# Patient Record
Sex: FEMALE | Race: WHITE
[De-identification: names, ages, dates, MRNs, and addresses within clinical notes are randomized per-mention and may not be internally consistent; named-entity substitution may affect disease eponyms.]

---

## 2018-01-24 ENCOUNTER — HOSPITAL ENCOUNTER (OUTPATIENT)
Dept: HOSPITAL 63 - CT | Age: 78
Discharge: HOME | End: 2018-01-24
Attending: FAMILY MEDICINE
Payer: MEDICARE

## 2018-01-24 DIAGNOSIS — K76.0: ICD-10-CM

## 2018-01-24 DIAGNOSIS — R10.84: Primary | ICD-10-CM

## 2018-01-24 DIAGNOSIS — K57.30: ICD-10-CM

## 2018-01-24 PROCEDURE — 74176 CT ABD & PELVIS W/O CONTRAST: CPT

## 2018-01-24 NOTE — RAD
CT ABD PEL W/ORAL CONTRST ONLY 

 

Indication: Abdominal pain, left side distention

 

Technique: Noncontrast CT imaging was performed of the abdomen and pelvis,

multiplanar reconstruction images submitted. Oral contrast was given. One 

or more of the following individualized dose reduction techniques were 

utilized for this examination:  1. Automated exposure control  2. 

Adjustment of the mA and/or kV according to patient size  3. Use of 

iterative reconstruction technique.

 

Comparison: None available

 

Findings:  

There is no abnormality limited visualized lung bases. There is some 

motion degradation. There is diffuse hepatic steatosis. Accurate 

evaluation of abdominal visceral organs is limited without intravenous 

contrast, no obvious focal abnormality of the pancreas. There has been 

cholecystectomy. There is no hydronephrosis or renal calculus. There is 

scattered atherosclerotic calcification abdominal aorta. The bowel is not 

considered significantly dilated. Segment of small bowel in the left and 

central abdomen is not fully opacified with contrast for accurate 

evaluation. There is mild-to-moderate sigmoid diverticulosis and minimally

of the descending colon without associated inflammatory change. There is 

retained stool greatest of the ascending and transverse colon. Appendix is

not clearly identified if still present. Urinary bladder is somewhat 

distended. There are Schmorl's nodes at L1-2. There is mild lumbar 

dextroscoliosis.

 

IMPRESSION:

1.  No significant inflammatory change is identified. Appendix is not 

confidently identified if still present. There is retained stool in the 

colon. There is colonic diverticulosis.

2. There is hepatic steatosis.

 

Electronically signed by: Gerard Shabazz MD (1/24/2018 3:25 PM) 

[id number]

## 2020-12-24 ENCOUNTER — HOSPITAL ENCOUNTER (INPATIENT)
Dept: HOSPITAL 63 - ER | Age: 80
LOS: 7 days | Discharge: HOME HEALTH | DRG: 871 | End: 2020-12-31
Attending: FAMILY MEDICINE | Admitting: FAMILY MEDICINE
Payer: MEDICARE

## 2020-12-24 VITALS — SYSTOLIC BLOOD PRESSURE: 123 MMHG | DIASTOLIC BLOOD PRESSURE: 65 MMHG

## 2020-12-24 VITALS — WEIGHT: 151.9 LBS | HEIGHT: 62 IN | BODY MASS INDEX: 27.95 KG/M2

## 2020-12-24 DIAGNOSIS — F17.210: ICD-10-CM

## 2020-12-24 DIAGNOSIS — N18.32: ICD-10-CM

## 2020-12-24 DIAGNOSIS — I13.0: ICD-10-CM

## 2020-12-24 DIAGNOSIS — E78.00: ICD-10-CM

## 2020-12-24 DIAGNOSIS — F41.9: ICD-10-CM

## 2020-12-24 DIAGNOSIS — I50.32: ICD-10-CM

## 2020-12-24 DIAGNOSIS — U07.1: ICD-10-CM

## 2020-12-24 DIAGNOSIS — D64.9: ICD-10-CM

## 2020-12-24 DIAGNOSIS — Z85.42: ICD-10-CM

## 2020-12-24 DIAGNOSIS — N17.9: ICD-10-CM

## 2020-12-24 DIAGNOSIS — Z80.1: ICD-10-CM

## 2020-12-24 DIAGNOSIS — E78.5: ICD-10-CM

## 2020-12-24 DIAGNOSIS — J12.89: ICD-10-CM

## 2020-12-24 DIAGNOSIS — E03.9: ICD-10-CM

## 2020-12-24 DIAGNOSIS — M19.90: ICD-10-CM

## 2020-12-24 DIAGNOSIS — Z99.81: ICD-10-CM

## 2020-12-24 DIAGNOSIS — Z95.1: ICD-10-CM

## 2020-12-24 DIAGNOSIS — G47.33: ICD-10-CM

## 2020-12-24 DIAGNOSIS — A41.89: Primary | ICD-10-CM

## 2020-12-24 DIAGNOSIS — Z82.5: ICD-10-CM

## 2020-12-24 DIAGNOSIS — Z86.718: ICD-10-CM

## 2020-12-24 DIAGNOSIS — J43.9: ICD-10-CM

## 2020-12-24 DIAGNOSIS — J96.21: ICD-10-CM

## 2020-12-24 DIAGNOSIS — E11.22: ICD-10-CM

## 2020-12-24 DIAGNOSIS — E11.42: ICD-10-CM

## 2020-12-24 DIAGNOSIS — Z81.8: ICD-10-CM

## 2020-12-24 DIAGNOSIS — E05.90: ICD-10-CM

## 2020-12-24 DIAGNOSIS — Z90.49: ICD-10-CM

## 2020-12-24 DIAGNOSIS — K21.9: ICD-10-CM

## 2020-12-24 DIAGNOSIS — Z82.0: ICD-10-CM

## 2020-12-24 DIAGNOSIS — Z90.710: ICD-10-CM

## 2020-12-24 DIAGNOSIS — I25.10: ICD-10-CM

## 2020-12-24 DIAGNOSIS — Z95.5: ICD-10-CM

## 2020-12-24 LAB
ALBUMIN SERPL-MCNC: 3.5 G/DL (ref 3.4–5)
ALP SERPL-CCNC: 117 U/L (ref 46–116)
ALT SERPL-CCNC: 24 U/L (ref 14–59)
ANION GAP SERPL CALC-SCNC: 10 MMOL/L (ref 6–14)
AST SERPL-CCNC: 23 U/L (ref 15–37)
BASOPHILS # BLD AUTO: 0 X10^3/UL (ref 0–0.2)
BASOPHILS NFR BLD: 0 % (ref 0–3)
BILIRUB DIRECT SERPL-MCNC: 0.1 MG/DL (ref 0–0.2)
BILIRUB SERPL-MCNC: 0.3 MG/DL (ref 0.2–1)
CA-I SERPL ISE-MCNC: 22 MG/DL (ref 7–20)
CALCIUM SERPL-MCNC: 8.2 MG/DL (ref 8.5–10.1)
CHLORIDE SERPL-SCNC: 98 MMOL/L (ref 98–107)
CO2 SERPL-SCNC: 29 MMOL/L (ref 21–32)
CREAT SERPL-MCNC: 1.9 MG/DL (ref 0.6–1)
CRP SERPL-MCNC: 65.3 MG/L (ref 0–3.3)
EOSINOPHIL NFR BLD: 0 % (ref 0–3)
EOSINOPHIL NFR BLD: 0 X10^3/UL (ref 0–0.7)
ERYTHROCYTE [DISTWIDTH] IN BLOOD BY AUTOMATED COUNT: 12.8 % (ref 11.5–14.5)
FECAL OB PT: POSITIVE
GFR SERPLBLD BASED ON 1.73 SQ M-ARVRAT: 25.4 ML/MIN
GLUCOSE SERPL-MCNC: 129 MG/DL (ref 70–99)
HCT VFR BLD CALC: 33.1 % (ref 36–47)
HGB BLD-MCNC: 10.9 G/DL (ref 12–15.5)
LIPASE: 105 U/L (ref 73–393)
LYMPHOCYTES # BLD: 1.3 X10^3/UL (ref 1–4.8)
LYMPHOCYTES NFR BLD AUTO: 18 % (ref 24–48)
MAGNESIUM SERPL-MCNC: 1.9 MG/DL (ref 1.8–2.4)
MCH RBC QN AUTO: 32 PG (ref 25–35)
MCHC RBC AUTO-ENTMCNC: 33 G/DL (ref 31–37)
MCV RBC AUTO: 97 FL (ref 79–100)
MONO #: 0.6 X10^3/UL (ref 0–1.1)
MONOCYTES NFR BLD: 8 % (ref 0–9)
NEUT #: 5.4 X10^3UL (ref 1.8–7.7)
NEUTROPHILS NFR BLD AUTO: 73 % (ref 31–73)
PLATELET # BLD AUTO: 199 X10^3/UL (ref 140–400)
POTASSIUM SERPL-SCNC: 4.4 MMOL/L (ref 3.5–5.1)
PROT SERPL-MCNC: 7.5 G/DL (ref 6.4–8.2)
RBC # BLD AUTO: 3.41 X10^6/UL (ref 3.5–5.4)
SODIUM SERPL-SCNC: 137 MMOL/L (ref 136–145)
WBC # BLD AUTO: 7.4 X10^3/UL (ref 4–11)

## 2020-12-24 PROCEDURE — 80048 BASIC METABOLIC PNL TOTAL CA: CPT

## 2020-12-24 PROCEDURE — 85018 HEMOGLOBIN: CPT

## 2020-12-24 PROCEDURE — 85014 HEMATOCRIT: CPT

## 2020-12-24 PROCEDURE — 83540 ASSAY OF IRON: CPT

## 2020-12-24 PROCEDURE — 86140 C-REACTIVE PROTEIN: CPT

## 2020-12-24 PROCEDURE — 84443 ASSAY THYROID STIM HORMONE: CPT

## 2020-12-24 PROCEDURE — 85730 THROMBOPLASTIN TIME PARTIAL: CPT

## 2020-12-24 PROCEDURE — 83880 ASSAY OF NATRIURETIC PEPTIDE: CPT

## 2020-12-24 PROCEDURE — 93971 EXTREMITY STUDY: CPT

## 2020-12-24 PROCEDURE — 96375 TX/PRO/DX INJ NEW DRUG ADDON: CPT

## 2020-12-24 PROCEDURE — 93005 ELECTROCARDIOGRAM TRACING: CPT

## 2020-12-24 PROCEDURE — 80061 LIPID PANEL: CPT

## 2020-12-24 PROCEDURE — 80076 HEPATIC FUNCTION PANEL: CPT

## 2020-12-24 PROCEDURE — 85025 COMPLETE CBC W/AUTO DIFF WBC: CPT

## 2020-12-24 PROCEDURE — 84484 ASSAY OF TROPONIN QUANT: CPT

## 2020-12-24 PROCEDURE — 74176 CT ABD & PELVIS W/O CONTRAST: CPT

## 2020-12-24 PROCEDURE — U0003 INFECTIOUS AGENT DETECTION BY NUCLEIC ACID (DNA OR RNA); SEVERE ACUTE RESPIRATORY SYNDROME CORONAVIRUS 2 (SARS-COV-2) (CORONAVIRUS DISEASE [COVID-19]), AMPLIFIED PROBE TECHNIQUE, MAKING USE OF HIGH THROUGHPUT TECHNOLOGIES AS DESCRIBED BY CMS-2020-01-R: HCPCS

## 2020-12-24 PROCEDURE — 96374 THER/PROPH/DIAG INJ IV PUSH: CPT

## 2020-12-24 PROCEDURE — G0238 OTH RESP PROC, INDIV: HCPCS

## 2020-12-24 PROCEDURE — 83550 IRON BINDING TEST: CPT

## 2020-12-24 PROCEDURE — 87040 BLOOD CULTURE FOR BACTERIA: CPT

## 2020-12-24 PROCEDURE — A9540 TC99M MAA: HCPCS

## 2020-12-24 PROCEDURE — 74019 RADEX ABDOMEN 2 VIEWS: CPT

## 2020-12-24 PROCEDURE — 36415 COLL VENOUS BLD VENIPUNCTURE: CPT

## 2020-12-24 PROCEDURE — 85379 FIBRIN DEGRADATION QUANT: CPT

## 2020-12-24 PROCEDURE — 83690 ASSAY OF LIPASE: CPT

## 2020-12-24 PROCEDURE — 82550 ASSAY OF CK (CPK): CPT

## 2020-12-24 PROCEDURE — 83735 ASSAY OF MAGNESIUM: CPT

## 2020-12-24 PROCEDURE — 71045 X-RAY EXAM CHEST 1 VIEW: CPT

## 2020-12-24 PROCEDURE — 82274 ASSAY TEST FOR BLOOD FECAL: CPT

## 2020-12-24 PROCEDURE — 85610 PROTHROMBIN TIME: CPT

## 2020-12-24 PROCEDURE — 96361 HYDRATE IV INFUSION ADD-ON: CPT

## 2020-12-24 PROCEDURE — 78580 LUNG PERFUSION IMAGING: CPT

## 2020-12-24 RX ADMIN — ACETAMINOPHEN PRN MG: 325 TABLET, FILM COATED ORAL at 22:05

## 2020-12-24 NOTE — RAD
AP chest.



HISTORY: Dyspnea, weakness



AP view was taken of the chest. Heart is normal in size with evidence of bypass. There is no pleural 
effusion. There are mild right basilar infiltrates.



IMPRESSION:

1. Right basilar infiltrates.



Electronically signed by: Chris Medeiros MD (12/24/2020 6:29 PM) [facility]

## 2020-12-24 NOTE — NUR
ADMISSION:



The patient, ADÁN NEAL, 79 y/o, F admitted by EVA DAVIS MD, was given written 
information regarding hospital policies, unit procedures and contact persons.  Pt arrived to 
room 124 via gurney, accompanied by [facility] and nursing sup. Pt placed in contact and 
airborne precautions pending covid-19 swab result. Pt also reports multiple loose dark 
stools, awaiting cdif sample. Pt A/Ox4, pleasant. O2 at 2L via NC, which she also wears at 
home. Reports increased SOA and weakness, along with N/V/D over the past few days. Denies 
any close contacts with confirmed covid-19. Reviewed PMH and home meds over the phone with 
pt's dtr Lizeth, whom pt lives at home with. Discussed POC, pt and dtr V/U. Call light in 
reach. 



Valuables were checked and logged. All belongings left in room with pt.

## 2020-12-24 NOTE — EKG
[facility] [address], [place] [address]

Test Date:    2020               Test Time:    18:04:50

Pat Name:     ADÁN NEAL           Department:   

Patient ID:   [id number]           Room:          

Gender:       F                        Technician:   JULIET

:          [id number]               Requested By: VARSHA PAGE

Order Number: [id number]            Reading MD:     

                                 Measurements

Intervals                              Axis          

Rate:         77                       P:            45

OR:           146                      QRS:          41

QRSD:         74                       T:            70

QT:           372                                    

QTc:          423                                    

                           Interpretive Statements

SINUS RHYTHM

T ABNORMALITY IN ANTERIOR LEADS

ABNORMAL ECG

RI6.02

No previous ECG available for comparison

## 2020-12-24 NOTE — RAD
Supine abdomen.



HISTORY: Vomiting, history of abdominal surgeries



Supine view was taken of the abdomen. There are clips from a cholecystectomy. There is limited bowel 
gas. There is no obvious bowel obstruction. There are mild infiltrates in the right lung base. Stomac
h is nondistended.



IMPRESSION:

1. Nonspecific gas pattern without obstruction.

2. Mild infiltrates right lung base.



Electronically signed by: Chrsi Medeiros MD (12/24/2020 8:01 PM) [facility]

## 2020-12-25 VITALS — SYSTOLIC BLOOD PRESSURE: 118 MMHG | DIASTOLIC BLOOD PRESSURE: 52 MMHG

## 2020-12-25 VITALS — DIASTOLIC BLOOD PRESSURE: 48 MMHG | SYSTOLIC BLOOD PRESSURE: 119 MMHG

## 2020-12-25 VITALS — DIASTOLIC BLOOD PRESSURE: 51 MMHG | SYSTOLIC BLOOD PRESSURE: 105 MMHG

## 2020-12-25 VITALS — DIASTOLIC BLOOD PRESSURE: 55 MMHG | SYSTOLIC BLOOD PRESSURE: 121 MMHG

## 2020-12-25 VITALS — DIASTOLIC BLOOD PRESSURE: 51 MMHG | SYSTOLIC BLOOD PRESSURE: 139 MMHG

## 2020-12-25 LAB
ANION GAP SERPL CALC-SCNC: 8 MMOL/L (ref 6–14)
BASOPHILS # BLD AUTO: 0 X10^3/UL (ref 0–0.2)
BASOPHILS NFR BLD: 0 % (ref 0–3)
CA-I SERPL ISE-MCNC: 21 MG/DL (ref 7–20)
CALCIUM SERPL-MCNC: 8 MG/DL (ref 8.5–10.1)
CHLORIDE SERPL-SCNC: 102 MMOL/L (ref 98–107)
CO2 SERPL-SCNC: 30 MMOL/L (ref 21–32)
CREAT SERPL-MCNC: 1.7 MG/DL (ref 0.6–1)
EOSINOPHIL NFR BLD: 0 % (ref 0–3)
EOSINOPHIL NFR BLD: 0 X10^3/UL (ref 0–0.7)
ERYTHROCYTE [DISTWIDTH] IN BLOOD BY AUTOMATED COUNT: 13.2 % (ref 11.5–14.5)
GFR SERPLBLD BASED ON 1.73 SQ M-ARVRAT: 28.8 ML/MIN
GLUCOSE SERPL-MCNC: 97 MG/DL (ref 70–99)
HCT VFR BLD CALC: 31.2 % (ref 36–47)
HCT VFR BLD CALC: 31.9 % (ref 36–47)
HGB BLD-MCNC: 10.1 G/DL (ref 12–15.5)
HGB BLD-MCNC: 10.4 G/DL (ref 12–15.5)
LYMPHOCYTES # BLD: 2.9 X10^3/UL (ref 1–4.8)
LYMPHOCYTES NFR BLD AUTO: 47 % (ref 24–48)
MCH RBC QN AUTO: 31 PG (ref 25–35)
MCHC RBC AUTO-ENTMCNC: 32 G/DL (ref 31–37)
MCV RBC AUTO: 97 FL (ref 79–100)
MONO #: 0.5 X10^3/UL (ref 0–1.1)
MONOCYTES NFR BLD: 8 % (ref 0–9)
NEUT #: 2.8 X10^3UL (ref 1.8–7.7)
NEUTROPHILS NFR BLD AUTO: 45 % (ref 31–73)
PLATELET # BLD AUTO: 167 X10^3/UL (ref 140–400)
POTASSIUM SERPL-SCNC: 3.7 MMOL/L (ref 3.5–5.1)
RBC # BLD AUTO: 3.2 X10^6/UL (ref 3.5–5.4)
SODIUM SERPL-SCNC: 140 MMOL/L (ref 136–145)
WBC # BLD AUTO: 6.3 X10^3/UL (ref 4–11)

## 2020-12-25 RX ADMIN — IPRATROPIUM BROMIDE AND ALBUTEROL SCH PUFF: 20; 100 SPRAY, METERED RESPIRATORY (INHALATION) at 08:00

## 2020-12-25 RX ADMIN — SENNOSIDES A AND B SCH MG: 8.6 TABLET, FILM COATED ORAL at 08:48

## 2020-12-25 RX ADMIN — IPRATROPIUM BROMIDE AND ALBUTEROL SCH PUFF: 20; 100 SPRAY, METERED RESPIRATORY (INHALATION) at 16:00

## 2020-12-25 RX ADMIN — METOPROLOL SUCCINATE SCH MG: 50 TABLET, EXTENDED RELEASE ORAL at 08:48

## 2020-12-25 RX ADMIN — SUCRALFATE SCH GM: 1 TABLET ORAL at 20:59

## 2020-12-25 RX ADMIN — FAMOTIDINE SCH MG: 20 TABLET ORAL at 08:48

## 2020-12-25 RX ADMIN — SUCRALFATE SCH GM: 1 TABLET ORAL at 11:53

## 2020-12-25 RX ADMIN — SUCRALFATE SCH GM: 1 TABLET ORAL at 17:00

## 2020-12-25 RX ADMIN — DIAZEPAM SCH MG: 5 TABLET ORAL at 20:58

## 2020-12-25 RX ADMIN — IPRATROPIUM BROMIDE AND ALBUTEROL SCH PUFF: 20; 100 SPRAY, METERED RESPIRATORY (INHALATION) at 11:53

## 2020-12-25 RX ADMIN — VITAMIN D, TAB 1000IU (100/BT) SCH UNIT: 25 TAB at 08:48

## 2020-12-25 RX ADMIN — POTASSIUM CHLORIDE SCH MEQ: 1500 TABLET, EXTENDED RELEASE ORAL at 08:00

## 2020-12-25 RX ADMIN — FLUTICASONE PROPIONATE SCH SPRAY: 50 SPRAY, METERED NASAL at 08:47

## 2020-12-25 RX ADMIN — Medication SCH MG: at 08:48

## 2020-12-25 RX ADMIN — IPRATROPIUM BROMIDE AND ALBUTEROL SCH PUFF: 20; 100 SPRAY, METERED RESPIRATORY (INHALATION) at 00:09

## 2020-12-25 RX ADMIN — ISOSORBIDE MONONITRATE SCH MG: 30 TABLET, EXTENDED RELEASE ORAL at 08:47

## 2020-12-25 RX ADMIN — ACETAMINOPHEN PRN MG: 325 TABLET, FILM COATED ORAL at 05:59

## 2020-12-25 RX ADMIN — IPRATROPIUM BROMIDE AND ALBUTEROL SCH PUFF: 20; 100 SPRAY, METERED RESPIRATORY (INHALATION) at 20:00

## 2020-12-25 NOTE — NUR
Pt has been sleepy today but easily arousable. Pt down for CT abd in am. Results pending. Pt 
has hx of DVT LLE and D-Dimer elevated. US neg. Pt to have VQ scan when COVID results 
return. HH done, remains stable. Lungs diminished bases. SOA with exertion. O2 remains at 
2L. Pt has been AF today. No c/o N/V. No loose stools.

## 2020-12-25 NOTE — HP
ADMIT DATE:  12/24/2020



HISTORY OF PRESENT ILLNESS:  This is an 80-year-old female came in through the

Emergency Room.  She has been having a 2-3 day history of nausea.  She denies

vomiting blood or diarrhea, has been taking Pepto-Bismol for about a week, which

may have turned her stools black.  The patient otherwise feels short of breath. 

Her x-rays showed pneumonia.  She says she has not been around anybody to

contract anything and she is a little bit confused by how she may have gotten

it, although she has a history of heavy smoking.  The patient has general

weakness, fever, nausea, vomiting, arthralgias, myalgias, malaise.



PAST MEDICAL HISTORY:  As indicated.  She has had peripheral neuropathy, CHF,

cardiac surgery, CABG, coronary stent x 1, hypercholesterolemia, hypertension,

DVT in the past in the left leg, COPD, asthma.  She is on continuous oxygen,

sleep apnea, diverticulitis, appendectomy, cholecystectomy, partial colectomy,

constipation, GERD, total hysterectomy for uterine cancer, arthritis,

hypothyroidism, anxiety, tobacco abuse.  The patient also has a history of

chronic renal failure and has been seen by Nephrology in the past.



SOCIAL HISTORY:  Has about a 50-pack-year history of smoking.  



FAMILY HISTORY:  She denies influenza vaccination and sleep difficulties, has

positive history of lung cancer in the father, Parkinson's and COPD in the

mother.



ALLERGIES:  IODINE CONTAINING PRODUCTS.  



MEDICATIONS:  The patient's medications were reconciled.  Benadryl 25 mg at

bedtime, ipratropium bromide for breathing.  Albuterol sulfate nebulizer, Plavix

75 mg daily, isosorbide, nitroglycerin, metoprolol 50 and amlodipine 2.5 mg,

aspirin 81, diazepam, potassium chloride, furosemide, fluticasone nasal spray,

Advair 500/50, Tagamet 400 b.i.d., levothyroxine sodium 112 and vitamin D3.



REVIEW OF SYSTEMS:  The patient notes increased shortness of breath, generalized

anxiety, difficulty breathing.  Otherwise, she has some nausea, but no vomiting.

 Has some diarrhea, but that seems to have stopped and neurologically, she has

had no deterioration there.



PHYSICAL EXAMINATION:

GENERAL:  Pleasant white female.

VITAL SIGNS:  Blood pressure 118/52, respiratory rate 22, pulse in the 70s,

temperature 101.5.

HEENT:  The patient otherwise head was atraumatic, normocephalic.  Eyes:  PERRLA

without jaundice.  The mouth and throat were normal.

NECK:  Supple.

LUNGS:  Diminished with wheezing noted throughout.

CARDIOVASCULAR:  Regular sinus rhythm, S1, S2, without murmur, rub, thrill, or

extra heart sounds.

ABDOMEN:  Soft, nontender, no rebounding or guarding.  Positive bowel sounds, no

hepatosplenomegaly was noted.

EXTREMITIES:  No clubbing, cyanosis, nor edema.

NEUROLOGIC:  The patient is alert and oriented to baseline there.



LABORATORY DATA:  The patient otherwise hemoglobin 10 and 31, white count 6. 

Creatinine 1.9.  TSH decreased to 0.032, D-dimer 0.84.  Stool hemoccult

positive.  The patient's venous Doppler left leg negative.  The patient's BNP of

approximately 1900.



IMPRESSION:  Sepsis, pneumonia of unspecified etiology at the present time,

hematochezia, chronic kidney disease stage 3B, hyperthyroidism,  history of

exacerbation of chronic obstructive pulmonary disease with respiratory distress.



PLAN:  The patient will be admitted, placed on IV antibiotic therapy.  Continue

to monitor her accordingly and make further evaluation of her breathing

treatments per Combivent.  She is also being checked for COVID, but that is

still pending.  She has not had any contact she says with the outside world for

the last several months.





______________________________

EVA DVAIS MD



DR:  ABDIEL/josiane  JOB#:  [id number] / [id number]

DD:  12/25/2020 18:05  DT:  12/25/2020 19:09

## 2020-12-25 NOTE — RAD
Examination: Left Lower Extremity Venous Doppler Ultrasound



History: Left leg pain



Comparison: None



Procedure: Gray scale, color flow 2D and spectal waveform analysis images are obtained with and witho
ut compression in the area of the common femoral vein, superficial femoral vein - femoral vein juncti
on, main femoral vein (superficial femoral vein) and popliteal vein. Veins of the proximal calf are a
lso imaged.



Findings:



There is normal duplex flow, color flow and compressibility of all visualized vein segments. No evide
nce of deep venous thrombus is present.



Impression: 



No evidence of DVT in the left lower extremity venous system.



Electronically signed by: Shahbaz Corral MD (12/25/2020 2:53 PM) [id number]

## 2020-12-25 NOTE — RAD
Exam: CT of abdomen and pelvis without contrast



INDICATION: Right lower quadrant pain, Hemoccult positive



TECHNIQUE: Sequential axial images through the abdomen and pelvis obtained without IV contrast. Sagit
jan and coronal reformatted images were reconstructed from the axial data and reviewed.



Comparisons: None



FINDINGS:

Heart size is normal. No pericardial effusion. Patchy airspace disease at the right lung base. No ple
ural effusion.



There is diffuse hepatic steatosis. Spleen, pancreas, and adrenals are unremarkable. Gallbladder is s
urgically absent.



No perinephric inflammation or hydronephrosis. No renal or ureteral calculi are identified.



Bladder is partially distended and not well evaluated. Uterus is absent. No abnormal adnexal mass.



Extensive diverticulosis is noted in the sigmoid colon without evidence of acute diverticulitis. Martha
rosey of the large and small bowel are unremarkable. Appendix is not identified. No free intra-abdomi
nal air or fluid. No obstruction.



Abdominal aorta has a normal course and caliber.



No enlarged intra-abdominal lymph nodes are identified.



IMPRESSION:

1.  Patchy airspace disease at the right lung base favored to be infectious or inflammatory in etiolo
gy.

2.  Diverticulosis without evidence of acute diverticulitis.

3.  Diffuse hepatic steatosis.





Exposure: One or more of the following in the visualized dose reduction techniques were utilized for 
this examination:

1.  Automated exposure control

2.  Adjustment of the MA and/or KV according to patient size

3.  Use of iterative of reconstructive technique



Electronically signed by: Toya Peacock MD (12/25/2020 6:21 PM) [id number]

## 2020-12-26 VITALS — SYSTOLIC BLOOD PRESSURE: 105 MMHG | DIASTOLIC BLOOD PRESSURE: 51 MMHG

## 2020-12-26 VITALS — SYSTOLIC BLOOD PRESSURE: 107 MMHG | DIASTOLIC BLOOD PRESSURE: 55 MMHG

## 2020-12-26 VITALS — DIASTOLIC BLOOD PRESSURE: 64 MMHG | SYSTOLIC BLOOD PRESSURE: 103 MMHG

## 2020-12-26 VITALS — SYSTOLIC BLOOD PRESSURE: 111 MMHG | DIASTOLIC BLOOD PRESSURE: 45 MMHG

## 2020-12-26 VITALS — DIASTOLIC BLOOD PRESSURE: 54 MMHG | SYSTOLIC BLOOD PRESSURE: 141 MMHG

## 2020-12-26 RX ADMIN — Medication SCH MG: at 09:42

## 2020-12-26 RX ADMIN — IPRATROPIUM BROMIDE AND ALBUTEROL SCH PUFF: 20; 100 SPRAY, METERED RESPIRATORY (INHALATION) at 16:00

## 2020-12-26 RX ADMIN — SENNOSIDES A AND B SCH MG: 8.6 TABLET, FILM COATED ORAL at 09:41

## 2020-12-26 RX ADMIN — ACETAMINOPHEN PRN MG: 500 TABLET ORAL at 14:22

## 2020-12-26 RX ADMIN — SUCRALFATE SCH GM: 1 TABLET ORAL at 09:41

## 2020-12-26 RX ADMIN — IPRATROPIUM BROMIDE AND ALBUTEROL SCH PUFF: 20; 100 SPRAY, METERED RESPIRATORY (INHALATION) at 08:00

## 2020-12-26 RX ADMIN — SUCRALFATE SCH GM: 1 TABLET ORAL at 21:06

## 2020-12-26 RX ADMIN — SUCRALFATE SCH GM: 1 TABLET ORAL at 12:56

## 2020-12-26 RX ADMIN — ISOSORBIDE MONONITRATE SCH MG: 30 TABLET, EXTENDED RELEASE ORAL at 09:00

## 2020-12-26 RX ADMIN — Medication SCH CAP: at 21:06

## 2020-12-26 RX ADMIN — IPRATROPIUM BROMIDE AND ALBUTEROL SCH PUFF: 20; 100 SPRAY, METERED RESPIRATORY (INHALATION) at 20:00

## 2020-12-26 RX ADMIN — FLUTICASONE PROPIONATE SCH SPRAY: 50 SPRAY, METERED NASAL at 09:00

## 2020-12-26 RX ADMIN — IPRATROPIUM BROMIDE AND ALBUTEROL SCH PUFF: 20; 100 SPRAY, METERED RESPIRATORY (INHALATION) at 12:00

## 2020-12-26 RX ADMIN — VITAMIN D, TAB 1000IU (100/BT) SCH UNIT: 25 TAB at 09:40

## 2020-12-26 RX ADMIN — DEXAMETHASONE SODIUM PHOSPHATE SCH MG: 4 INJECTION, SOLUTION INTRAMUSCULAR; INTRAVENOUS at 17:19

## 2020-12-26 RX ADMIN — SUCRALFATE SCH GM: 1 TABLET ORAL at 17:19

## 2020-12-26 RX ADMIN — ACETAMINOPHEN PRN MG: 500 TABLET ORAL at 21:06

## 2020-12-26 RX ADMIN — FAMOTIDINE SCH MG: 20 TABLET ORAL at 09:41

## 2020-12-26 RX ADMIN — POTASSIUM CHLORIDE SCH MEQ: 1500 TABLET, EXTENDED RELEASE ORAL at 09:42

## 2020-12-26 RX ADMIN — DIAZEPAM SCH MG: 5 TABLET ORAL at 21:06

## 2020-12-26 RX ADMIN — PANTOPRAZOLE SODIUM SCH MG: 40 TABLET, DELAYED RELEASE ORAL at 09:41

## 2020-12-26 RX ADMIN — METOPROLOL SUCCINATE SCH MG: 50 TABLET, EXTENDED RELEASE ORAL at 09:43

## 2020-12-26 RX ADMIN — AZITHROMYCIN SCH MG: 250 TABLET, FILM COATED ORAL at 09:41

## 2020-12-26 NOTE — NUR
Pt pleasant and cooperative.  She stands and pivots to bedside commode with one-person 
assist.  She is weak and cannot stand for more than a few seconds without assistance.  Pt 
slept most of the evening.  Will continue to monitor.

## 2020-12-26 NOTE — PN
DATE:  



SUBJECTIVE:  An 80-year-old female who came in with acute breathing problems,

thought to have possible GI bleed, but was noted to have Pepto-Bismol, turning

her stools black.  Hemoglobin actually went up.  So, we will put her on Lovenox

as her COVID-19 test apparently has come back positive and consequently, we will

give her Decadron, continue on the Carafate, Pepcid at night and Protonix in the

morning.  Blood cultures have been negative.  The patient is still fairly weak.



OBJECTIVE:

VITAL SIGNS:  Blood pressure 150/50, respiratory rate 18, pulse 76, temperature

101.1.  The patient's oxygen saturation is still good at 3 liters at 94%.

GENERAL:  The patient is alert and oriented, but weak.

LUNGS:  Show expiratory wheezes.  Decreased breath sounds.

CARDIOVASCULAR:  Regular sinus rhythm.

ABDOMEN:  Soft, nontender.

EXTREMITIES:  No clubbing, cyanosis, edema.

NEUROLOGIC:  Intact.



We will continue with present drug regimen.



IMPRESSION:  Acute respiratory failure, sepsis, pneumonia, COVID-19, history of

chronic obstructive pulmonary disease with exacerbation, chronic hypoxia and

respiratory failure with hypoxia.



PLAN:  Continue with aggressive Combivent, steroids, Lovenox, azithromycin,

other antibiotics for her pneumonia and make further evaluation as we progress.





______________________________

EVA DAVIS MD



DR:  ABDIEL/josiane  JOB#:  [id number] / [id number]

DD:  12/26/2020 18:30  DT:  12/26/2020 22:57

## 2020-12-27 VITALS — DIASTOLIC BLOOD PRESSURE: 50 MMHG | SYSTOLIC BLOOD PRESSURE: 105 MMHG

## 2020-12-27 VITALS — SYSTOLIC BLOOD PRESSURE: 106 MMHG | DIASTOLIC BLOOD PRESSURE: 43 MMHG

## 2020-12-27 VITALS — DIASTOLIC BLOOD PRESSURE: 55 MMHG | SYSTOLIC BLOOD PRESSURE: 106 MMHG

## 2020-12-27 VITALS — DIASTOLIC BLOOD PRESSURE: 55 MMHG | SYSTOLIC BLOOD PRESSURE: 101 MMHG

## 2020-12-27 LAB
ANION GAP SERPL CALC-SCNC: 9 MMOL/L (ref 6–14)
BASOPHILS # BLD AUTO: 0 X10^3/UL (ref 0–0.2)
BASOPHILS NFR BLD: 0 % (ref 0–3)
CA-I SERPL ISE-MCNC: 37 MG/DL (ref 7–20)
CALCIUM SERPL-MCNC: 8.3 MG/DL (ref 8.5–10.1)
CHLORIDE SERPL-SCNC: 99 MMOL/L (ref 98–107)
CO2 SERPL-SCNC: 26 MMOL/L (ref 21–32)
CREAT SERPL-MCNC: 1.4 MG/DL (ref 0.6–1)
EOSINOPHIL NFR BLD: 0 % (ref 0–3)
EOSINOPHIL NFR BLD: 0 X10^3/UL (ref 0–0.7)
ERYTHROCYTE [DISTWIDTH] IN BLOOD BY AUTOMATED COUNT: 13.1 % (ref 11.5–14.5)
GFR SERPLBLD BASED ON 1.73 SQ M-ARVRAT: 36.1 ML/MIN
GLUCOSE SERPL-MCNC: 230 MG/DL (ref 70–99)
HCT VFR BLD CALC: 29.8 % (ref 36–47)
HGB BLD-MCNC: 9.7 G/DL (ref 12–15.5)
LYMPHOCYTES # BLD: 1.1 X10^3/UL (ref 1–4.8)
LYMPHOCYTES NFR BLD AUTO: 12 % (ref 24–48)
MCH RBC QN AUTO: 31 PG (ref 25–35)
MCHC RBC AUTO-ENTMCNC: 32 G/DL (ref 31–37)
MCV RBC AUTO: 97 FL (ref 79–100)
MONO #: 0.4 X10^3/UL (ref 0–1.1)
MONOCYTES NFR BLD: 5 % (ref 0–9)
NEUT #: 7.6 X10^3UL (ref 1.8–7.7)
NEUTROPHILS NFR BLD AUTO: 83 % (ref 31–73)
PLATELET # BLD AUTO: 235 X10^3/UL (ref 140–400)
POTASSIUM SERPL-SCNC: 4.6 MMOL/L (ref 3.5–5.1)
RBC # BLD AUTO: 3.08 X10^6/UL (ref 3.5–5.4)
SODIUM SERPL-SCNC: 134 MMOL/L (ref 136–145)
WBC # BLD AUTO: 9.2 X10^3/UL (ref 4–11)

## 2020-12-27 RX ADMIN — DEXAMETHASONE SODIUM PHOSPHATE SCH MG: 4 INJECTION, SOLUTION INTRAMUSCULAR; INTRAVENOUS at 12:21

## 2020-12-27 RX ADMIN — POTASSIUM CHLORIDE SCH MEQ: 1500 TABLET, EXTENDED RELEASE ORAL at 09:01

## 2020-12-27 RX ADMIN — PRIMIDONE SCH MG: 50 TABLET ORAL at 09:02

## 2020-12-27 RX ADMIN — ENOXAPARIN SODIUM SCH MG: 100 INJECTION SUBCUTANEOUS at 17:15

## 2020-12-27 RX ADMIN — SENNOSIDES A AND B SCH MG: 8.6 TABLET, FILM COATED ORAL at 09:01

## 2020-12-27 RX ADMIN — DIAZEPAM SCH MG: 5 TABLET ORAL at 20:59

## 2020-12-27 RX ADMIN — DEXAMETHASONE SODIUM PHOSPHATE SCH MG: 4 INJECTION, SOLUTION INTRAMUSCULAR; INTRAVENOUS at 17:15

## 2020-12-27 RX ADMIN — DEXAMETHASONE SODIUM PHOSPHATE SCH MG: 4 INJECTION, SOLUTION INTRAMUSCULAR; INTRAVENOUS at 00:45

## 2020-12-27 RX ADMIN — SUCRALFATE SCH GM: 1 TABLET ORAL at 20:59

## 2020-12-27 RX ADMIN — FAMOTIDINE SCH MG: 20 TABLET ORAL at 09:01

## 2020-12-27 RX ADMIN — SUCRALFATE SCH GM: 1 TABLET ORAL at 17:15

## 2020-12-27 RX ADMIN — IPRATROPIUM BROMIDE AND ALBUTEROL SCH PUFF: 20; 100 SPRAY, METERED RESPIRATORY (INHALATION) at 12:00

## 2020-12-27 RX ADMIN — IPRATROPIUM BROMIDE AND ALBUTEROL SCH PUFF: 20; 100 SPRAY, METERED RESPIRATORY (INHALATION) at 20:00

## 2020-12-27 RX ADMIN — LEVOTHYROXINE SODIUM SCH MCG: 100 TABLET ORAL at 06:00

## 2020-12-27 RX ADMIN — DEXAMETHASONE SODIUM PHOSPHATE SCH MG: 4 INJECTION, SOLUTION INTRAMUSCULAR; INTRAVENOUS at 06:05

## 2020-12-27 RX ADMIN — Medication SCH CAP: at 09:01

## 2020-12-27 RX ADMIN — ISOSORBIDE MONONITRATE SCH MG: 30 TABLET, EXTENDED RELEASE ORAL at 09:01

## 2020-12-27 RX ADMIN — AZITHROMYCIN SCH MG: 250 TABLET, FILM COATED ORAL at 09:02

## 2020-12-27 RX ADMIN — VITAMIN D, TAB 1000IU (100/BT) SCH UNIT: 25 TAB at 09:01

## 2020-12-27 RX ADMIN — SUCRALFATE SCH GM: 1 TABLET ORAL at 12:21

## 2020-12-27 RX ADMIN — IPRATROPIUM BROMIDE AND ALBUTEROL SCH PUFF: 20; 100 SPRAY, METERED RESPIRATORY (INHALATION) at 16:00

## 2020-12-27 RX ADMIN — Medication SCH MG: at 09:06

## 2020-12-27 RX ADMIN — IPRATROPIUM BROMIDE AND ALBUTEROL SCH PUFF: 20; 100 SPRAY, METERED RESPIRATORY (INHALATION) at 08:00

## 2020-12-27 RX ADMIN — FLUTICASONE PROPIONATE SCH SPRAY: 50 SPRAY, METERED NASAL at 09:00

## 2020-12-27 RX ADMIN — METOPROLOL SUCCINATE SCH MG: 50 TABLET, EXTENDED RELEASE ORAL at 09:02

## 2020-12-27 RX ADMIN — PANTOPRAZOLE SODIUM SCH MG: 40 TABLET, DELAYED RELEASE ORAL at 09:02

## 2020-12-27 RX ADMIN — Medication SCH CAP: at 20:59

## 2020-12-27 RX ADMIN — SUCRALFATE SCH GM: 1 TABLET ORAL at 09:01

## 2020-12-27 NOTE — NUR
Pt expressed increased shortness of air and anxiety.  She asked questions about the COVID 
prognosis and symptoms.  Disease process discussed with patient in general.  Pt slept most 
of the night, getting up to use the bedside commode twice.  Will continue to monitor.

## 2020-12-28 VITALS — SYSTOLIC BLOOD PRESSURE: 133 MMHG | DIASTOLIC BLOOD PRESSURE: 52 MMHG

## 2020-12-28 VITALS — SYSTOLIC BLOOD PRESSURE: 117 MMHG | DIASTOLIC BLOOD PRESSURE: 56 MMHG

## 2020-12-28 VITALS — DIASTOLIC BLOOD PRESSURE: 55 MMHG | SYSTOLIC BLOOD PRESSURE: 129 MMHG

## 2020-12-28 VITALS — DIASTOLIC BLOOD PRESSURE: 56 MMHG | SYSTOLIC BLOOD PRESSURE: 135 MMHG

## 2020-12-28 LAB
BASOPHILS # BLD AUTO: 0 X10^3/UL (ref 0–0.2)
BASOPHILS NFR BLD: 0 % (ref 0–3)
EOSINOPHIL NFR BLD: 0 % (ref 0–3)
EOSINOPHIL NFR BLD: 0 X10^3/UL (ref 0–0.7)
ERYTHROCYTE [DISTWIDTH] IN BLOOD BY AUTOMATED COUNT: 13 % (ref 11.5–14.5)
HCT VFR BLD CALC: 28.6 % (ref 36–47)
HGB BLD-MCNC: 9.4 G/DL (ref 12–15.5)
LYMPHOCYTES # BLD: 1.2 X10^3/UL (ref 1–4.8)
LYMPHOCYTES NFR BLD AUTO: 10 % (ref 24–48)
MCH RBC QN AUTO: 31 PG (ref 25–35)
MCHC RBC AUTO-ENTMCNC: 33 G/DL (ref 31–37)
MCV RBC AUTO: 95 FL (ref 79–100)
MONO #: 0.6 X10^3/UL (ref 0–1.1)
MONOCYTES NFR BLD: 6 % (ref 0–9)
NEUT #: 9.6 X10^3UL (ref 1.8–7.7)
NEUTROPHILS NFR BLD AUTO: 84 % (ref 31–73)
PLATELET # BLD AUTO: 278 X10^3/UL (ref 140–400)
RBC # BLD AUTO: 3.02 X10^6/UL (ref 3.5–5.4)
WBC # BLD AUTO: 11.4 X10^3/UL (ref 4–11)

## 2020-12-28 RX ADMIN — IPRATROPIUM BROMIDE AND ALBUTEROL SCH PUFF: 20; 100 SPRAY, METERED RESPIRATORY (INHALATION) at 09:46

## 2020-12-28 RX ADMIN — Medication SCH MG: at 09:45

## 2020-12-28 RX ADMIN — DIAZEPAM SCH MG: 5 TABLET ORAL at 20:36

## 2020-12-28 RX ADMIN — VITAMIN D, TAB 1000IU (100/BT) SCH UNIT: 25 TAB at 09:45

## 2020-12-28 RX ADMIN — METOPROLOL SUCCINATE SCH MG: 50 TABLET, EXTENDED RELEASE ORAL at 09:45

## 2020-12-28 RX ADMIN — DEXAMETHASONE SODIUM PHOSPHATE SCH MG: 4 INJECTION, SOLUTION INTRAMUSCULAR; INTRAVENOUS at 16:30

## 2020-12-28 RX ADMIN — IPRATROPIUM BROMIDE AND ALBUTEROL SCH PUFF: 20; 100 SPRAY, METERED RESPIRATORY (INHALATION) at 12:25

## 2020-12-28 RX ADMIN — IPRATROPIUM BROMIDE AND ALBUTEROL SCH PUFF: 20; 100 SPRAY, METERED RESPIRATORY (INHALATION) at 16:30

## 2020-12-28 RX ADMIN — ISOSORBIDE MONONITRATE SCH MG: 30 TABLET, EXTENDED RELEASE ORAL at 09:44

## 2020-12-28 RX ADMIN — IPRATROPIUM BROMIDE AND ALBUTEROL SCH PUFF: 20; 100 SPRAY, METERED RESPIRATORY (INHALATION) at 20:00

## 2020-12-28 RX ADMIN — SENNOSIDES A AND B SCH MG: 8.6 TABLET, FILM COATED ORAL at 09:44

## 2020-12-28 RX ADMIN — ENOXAPARIN SODIUM SCH MG: 100 INJECTION SUBCUTANEOUS at 16:30

## 2020-12-28 RX ADMIN — DEXAMETHASONE SODIUM PHOSPHATE SCH MG: 4 INJECTION, SOLUTION INTRAMUSCULAR; INTRAVENOUS at 20:37

## 2020-12-28 RX ADMIN — DEXAMETHASONE SODIUM PHOSPHATE SCH MG: 4 INJECTION, SOLUTION INTRAMUSCULAR; INTRAVENOUS at 05:57

## 2020-12-28 RX ADMIN — DEXAMETHASONE SODIUM PHOSPHATE SCH MG: 4 INJECTION, SOLUTION INTRAMUSCULAR; INTRAVENOUS at 00:06

## 2020-12-28 RX ADMIN — PRIMIDONE SCH MG: 50 TABLET ORAL at 09:46

## 2020-12-28 RX ADMIN — Medication SCH CAP: at 09:44

## 2020-12-28 RX ADMIN — AZITHROMYCIN SCH MG: 250 TABLET, FILM COATED ORAL at 09:45

## 2020-12-28 RX ADMIN — SUCRALFATE SCH GM: 1 TABLET ORAL at 16:30

## 2020-12-28 RX ADMIN — FLUTICASONE PROPIONATE SCH SPRAY: 50 SPRAY, METERED NASAL at 09:46

## 2020-12-28 RX ADMIN — POTASSIUM CHLORIDE SCH MEQ: 1500 TABLET, EXTENDED RELEASE ORAL at 09:46

## 2020-12-28 RX ADMIN — PANTOPRAZOLE SODIUM SCH MG: 40 TABLET, DELAYED RELEASE ORAL at 09:44

## 2020-12-28 RX ADMIN — SUCRALFATE SCH GM: 1 TABLET ORAL at 12:23

## 2020-12-28 RX ADMIN — SUCRALFATE SCH GM: 1 TABLET ORAL at 20:36

## 2020-12-28 RX ADMIN — FAMOTIDINE SCH MG: 20 TABLET ORAL at 09:45

## 2020-12-28 RX ADMIN — DEXAMETHASONE SODIUM PHOSPHATE SCH MG: 4 INJECTION, SOLUTION INTRAMUSCULAR; INTRAVENOUS at 12:23

## 2020-12-28 RX ADMIN — Medication SCH CAP: at 20:36

## 2020-12-28 RX ADMIN — LEVOTHYROXINE SODIUM SCH MCG: 100 TABLET ORAL at 05:57

## 2020-12-28 RX ADMIN — SUCRALFATE SCH GM: 1 TABLET ORAL at 09:44

## 2020-12-28 NOTE — PN
DATE:  12/27/2020



SUBJECTIVE:  An 80-year-old female in with COVID-19 pneumonia and exacerbation

of chronic obstructive pulmonary disease secondary to her COVID-19 pneumonia. 

The patient, otherwise, is resting fairly comfortably and making fairly good

progress overall, says she feels a little bit better overall and is still using

her Combivent inhaler.



OBJECTIVE:

VITAL SIGNS:  Blood pressure 110/40, respiratory rate 20, pulse 70, afebrile,

95% on 3 liters.

GENERAL:  The patient otherwise is alert and oriented, seems to be a little bit

more alert than she has been.

LUNGS:  Diminished, primarily in the bases, but actually improved.

CARDIOVASCULAR:  Regular sinus rhythm.

ABDOMEN:  Soft, nontender.

EXTREMITIES:  No clubbing, cyanosis, nor edema.

NEUROLOGIC:  Intact.



LABORATORY DATA:  Hemoglobin steady in the 10.4 range and will continue to be

monitored on that, as we did give her Lovenox because of the COVID-19. 

Otherwise, she seems to be on the basic drug regimen of some Lovenox as noted. 

She is on antibiotics for her pneumonia, Rocephin, Levaquin and azithromycin and

dexamethasone 4 mg every 6 hours.  The patient, otherwise, seems to be making

good progress and we will continue to monitor her accordingly.



IMPRESSION:  COVID-19 pneumonia, chronic kidney disease stage 3a, anemia.



PLAN:  As above.  Continue with present drug regimen as we progress there.





______________________________

EVA DAVIS MD



DR:  ABDIEL/josiane  JOB#:  [id number] / [id number]

DD:  12/27/2020 18:22  DT:  12/27/2020 21:49

## 2020-12-28 NOTE — PN
DATE:  



SUBJECTIVE:  An 80-year-old female in with COVID-19 pneumonia and acute

respiratory failure with hypoxia.  The patient is resting comfortably, little

bit confused, but seems to be making fairly good progress overall.  The patient

overall seems to be making fairly good progress, still having some difficulty

___ her breathing.  Her oxygen saturation does vary.  She is on 3 liters at 91%.



OBJECTIVE:

VITAL SIGNS:  Blood pressure 135/56, respiratory rate 20, pulse 72.  She is

running low-grade temperature today of 99.4.

LUNGS:  Otherwise, the patient's lungs are diminished throughout, poor movement

of air.

CARDIOVASCULAR:  Regular sinus rhythm.

ABDOMEN:  Soft, nontender.

NEUROLOGIC:  She is noted to be alert, but scanning for words and having some

problems here and there, but other than that considering everything, the patient

is making good progress overall.



IMPRESSION:  Sepsis with COVID-19 pneumonia, hematochezia, chronic kidney

disease stage 3B, hyperthyroidism, history of exacerbation of chronic

obstructive pulmonary disease with respiratory disease.  We will continue to

monitor her H and H and make further assessment on that.  She is in no condition

to be scoped and problem is, we started her back on Lovenox because of the

COVID, which probably is exacerbating possibly a little bleeding, but she

continues on her Protonix and Carafate.  So, we will do that.  Continue with

that and continue to monitor any other problems she will be having with that,

but she does not seem to be having any significant down turn to the hemoglobin

that has slid a little bit on the low side, but considering everything she is in

good condition.





______________________________

EVA DAVIS MD



DR:  ABDIEL/josiane  JOB#:  [id number] / [id number]

DD:  12/28/2020 18:32  DT:  12/28/2020 22:19

## 2020-12-29 VITALS — SYSTOLIC BLOOD PRESSURE: 127 MMHG | DIASTOLIC BLOOD PRESSURE: 52 MMHG

## 2020-12-29 VITALS — SYSTOLIC BLOOD PRESSURE: 121 MMHG | DIASTOLIC BLOOD PRESSURE: 55 MMHG

## 2020-12-29 VITALS — SYSTOLIC BLOOD PRESSURE: 171 MMHG | DIASTOLIC BLOOD PRESSURE: 86 MMHG

## 2020-12-29 VITALS — SYSTOLIC BLOOD PRESSURE: 134 MMHG | DIASTOLIC BLOOD PRESSURE: 54 MMHG

## 2020-12-29 VITALS — SYSTOLIC BLOOD PRESSURE: 121 MMHG | DIASTOLIC BLOOD PRESSURE: 52 MMHG

## 2020-12-29 LAB
HCT VFR BLD CALC: 27.7 % (ref 36–47)
HGB BLD-MCNC: 9.1 G/DL (ref 12–15.5)

## 2020-12-29 RX ADMIN — SUCRALFATE SCH GM: 1 TABLET ORAL at 16:20

## 2020-12-29 RX ADMIN — Medication SCH CAP: at 08:54

## 2020-12-29 RX ADMIN — AZITHROMYCIN SCH MG: 250 TABLET, FILM COATED ORAL at 08:58

## 2020-12-29 RX ADMIN — VITAMIN D, TAB 1000IU (100/BT) SCH UNIT: 25 TAB at 08:56

## 2020-12-29 RX ADMIN — IPRATROPIUM BROMIDE AND ALBUTEROL SCH PUFF: 20; 100 SPRAY, METERED RESPIRATORY (INHALATION) at 08:59

## 2020-12-29 RX ADMIN — IPRATROPIUM BROMIDE AND ALBUTEROL SCH PUFF: 20; 100 SPRAY, METERED RESPIRATORY (INHALATION) at 21:11

## 2020-12-29 RX ADMIN — NITROGLYCERIN PRN MG: 0.4 TABLET SUBLINGUAL at 19:15

## 2020-12-29 RX ADMIN — Medication SCH CAP: at 21:12

## 2020-12-29 RX ADMIN — SUCRALFATE SCH GM: 1 TABLET ORAL at 13:12

## 2020-12-29 RX ADMIN — PRIMIDONE SCH MG: 50 TABLET ORAL at 08:54

## 2020-12-29 RX ADMIN — ENOXAPARIN SODIUM SCH MG: 100 INJECTION SUBCUTANEOUS at 16:21

## 2020-12-29 RX ADMIN — Medication SCH MG: at 08:55

## 2020-12-29 RX ADMIN — FAMOTIDINE SCH MG: 20 TABLET ORAL at 08:55

## 2020-12-29 RX ADMIN — DIAZEPAM SCH MG: 5 TABLET ORAL at 21:11

## 2020-12-29 RX ADMIN — DEXAMETHASONE SODIUM PHOSPHATE SCH MG: 4 INJECTION, SOLUTION INTRAMUSCULAR; INTRAVENOUS at 05:34

## 2020-12-29 RX ADMIN — NITROGLYCERIN PRN MG: 0.4 TABLET SUBLINGUAL at 18:55

## 2020-12-29 RX ADMIN — IPRATROPIUM BROMIDE AND ALBUTEROL SCH PUFF: 20; 100 SPRAY, METERED RESPIRATORY (INHALATION) at 12:10

## 2020-12-29 RX ADMIN — LEVOTHYROXINE SODIUM SCH MCG: 100 TABLET ORAL at 05:34

## 2020-12-29 RX ADMIN — FLUTICASONE PROPIONATE SCH SPRAY: 50 SPRAY, METERED NASAL at 08:59

## 2020-12-29 RX ADMIN — METOPROLOL SUCCINATE SCH MG: 50 TABLET, EXTENDED RELEASE ORAL at 08:55

## 2020-12-29 RX ADMIN — DEXAMETHASONE SODIUM PHOSPHATE SCH MG: 4 INJECTION, SOLUTION INTRAMUSCULAR; INTRAVENOUS at 13:12

## 2020-12-29 RX ADMIN — SUCRALFATE SCH GM: 1 TABLET ORAL at 19:02

## 2020-12-29 RX ADMIN — SUCRALFATE SCH GM: 1 TABLET ORAL at 08:58

## 2020-12-29 RX ADMIN — POTASSIUM CHLORIDE SCH MEQ: 1500 TABLET, EXTENDED RELEASE ORAL at 08:58

## 2020-12-29 RX ADMIN — ISOSORBIDE MONONITRATE SCH MG: 30 TABLET, EXTENDED RELEASE ORAL at 08:55

## 2020-12-29 RX ADMIN — DEXAMETHASONE SODIUM PHOSPHATE SCH MG: 4 INJECTION, SOLUTION INTRAMUSCULAR; INTRAVENOUS at 21:11

## 2020-12-29 RX ADMIN — PANTOPRAZOLE SODIUM SCH MG: 40 TABLET, DELAYED RELEASE ORAL at 08:56

## 2020-12-29 RX ADMIN — IPRATROPIUM BROMIDE AND ALBUTEROL SCH PUFF: 20; 100 SPRAY, METERED RESPIRATORY (INHALATION) at 16:21

## 2020-12-29 RX ADMIN — SENNOSIDES A AND B SCH MG: 8.6 TABLET, FILM COATED ORAL at 08:58

## 2020-12-29 NOTE — RAD
NM LUNG PERFUSION SCAN



History:Reason: dvt left leg   dyspena   +  d  dimer / Spl. Instructions: Covid positive/ History:  



Comparison: Chest x-ray December 24, 2020.



Findings: Perfusion examination was performed. Ventilation images were not obtained due to Covid prot
ocol. Perfusion images were acquired after the patient was injected with 5.5 mCi of technetium 99m MA
A. 



Mild heterogeneous perfusion. Small perfusion defect within the left posterior lung. Matched perfusio
n defect within the right basilar segment. No moderate or large mismatch perfusion defect identified.




Impression:

1.  Low probability for pulmonary embolic disease.



Electronically signed by: Stephen Fairchild DO (12/29/2020 9:23 AM) [id number]

## 2020-12-29 NOTE — NUR
Patient had complaint of chest and left shoulder pain; SpO2=88, FL=582.  PRN medication 
provided per eMAR, O2 increased to 4Lpm, patient attached to tele for monitor; she states 
pain was much better after prn inhaler. Will continue to monitor and report to oncoming 
shift.

## 2020-12-29 NOTE — PN
DATE:  



SUBJECTIVE:  An 80-year-old female in with sepsis and COVID-19 pneumonia.  The

patient is resting fairly comfortably, making fairly good progress, sitting up

in her chair, today first time that has happened and she seems to be making

relatively some stable progress.  The patient's hemoglobin continues to slide a

little bit down to 9.1.



OBJECTIVE:

VITAL SIGNS:  The patient otherwise seems to be resting fairly comfortably,

feels a little bit stronger overall.  Blood pressure that of 120/55, respiratory

rate 20, pulse 80, afebrile.

GENERAL:  The patient is alert, oriented.

LUNGS:  Diminished, mild confusion, however.

CARDIOVASCULAR:  Regular sinus rhythm.

ABDOMEN:  Soft.

EXTREMITIES:  No clubbing, cyanosis or edema.

NEUROLOGIC:  Stable.  Oxygen saturation 92% on 3 liters, which is more or less

fairly close to her baseline.  She does have a long history of marked emphysema.



IMPRESSION:

1.  Sepsis with pneumonia COVID-19.

2.  Chronic kidney disease stage 3A.

3.  Anemia.



PLAN:  Continue to monitor the patient accordingly and make adjustments,

physical and occupational therapy to also evaluate.





______________________________

EVA DAVIS MD



DR:  ABDIEL/josiane  JOB#:  [id number] / [id number]

DD:  12/29/2020 18:24  DT:  12/29/2020 21:58

## 2020-12-30 VITALS — DIASTOLIC BLOOD PRESSURE: 50 MMHG | SYSTOLIC BLOOD PRESSURE: 116 MMHG

## 2020-12-30 VITALS — DIASTOLIC BLOOD PRESSURE: 78 MMHG | SYSTOLIC BLOOD PRESSURE: 137 MMHG

## 2020-12-30 VITALS — DIASTOLIC BLOOD PRESSURE: 66 MMHG | SYSTOLIC BLOOD PRESSURE: 126 MMHG

## 2020-12-30 VITALS — DIASTOLIC BLOOD PRESSURE: 57 MMHG | SYSTOLIC BLOOD PRESSURE: 122 MMHG

## 2020-12-30 VITALS — SYSTOLIC BLOOD PRESSURE: 129 MMHG | DIASTOLIC BLOOD PRESSURE: 54 MMHG

## 2020-12-30 LAB
BASOPHILS # BLD AUTO: 0 X10^3/UL (ref 0–0.2)
BASOPHILS NFR BLD: 0 % (ref 0–3)
CHOLEST/HDLC SERPL: 6 {RATIO}
EOSINOPHIL NFR BLD: 0 % (ref 0–3)
EOSINOPHIL NFR BLD: 0 X10^3/UL (ref 0–0.7)
ERYTHROCYTE [DISTWIDTH] IN BLOOD BY AUTOMATED COUNT: 13.4 % (ref 11.5–14.5)
HCT VFR BLD CALC: 34.5 % (ref 36–47)
HDLC SERPL-MCNC: 34 MG/DL (ref 40–60)
HGB BLD-MCNC: 10.7 G/DL (ref 12–15.5)
LDLC: 142 MG/DL (ref 0–100)
LYMPHOCYTES # BLD: 1.7 X10^3/UL (ref 1–4.8)
LYMPHOCYTES NFR BLD AUTO: 15 % (ref 24–48)
MCH RBC QN AUTO: 31 PG (ref 25–35)
MCHC RBC AUTO-ENTMCNC: 31 G/DL (ref 31–37)
MCV RBC AUTO: 100 FL (ref 79–100)
MONO #: 1.1 X10^3/UL (ref 0–1.1)
MONOCYTES NFR BLD: 9 % (ref 0–9)
NEUT #: 9 X10^3UL (ref 1.8–7.7)
NEUTROPHILS NFR BLD AUTO: 76 % (ref 31–73)
PLATELET # BLD AUTO: 366 X10^3/UL (ref 140–400)
RBC # BLD AUTO: 3.44 X10^6/UL (ref 3.5–5.4)
TRIGL SERPL-MCNC: 183 MG/DL (ref 0–150)
VLDLC: 36 MG/DL (ref 0–40)
WBC # BLD AUTO: 11.8 X10^3/UL (ref 4–11)

## 2020-12-30 RX ADMIN — METOPROLOL SUCCINATE SCH MG: 50 TABLET, EXTENDED RELEASE ORAL at 10:24

## 2020-12-30 RX ADMIN — SENNOSIDES A AND B SCH MG: 8.6 TABLET, FILM COATED ORAL at 10:24

## 2020-12-30 RX ADMIN — DEXAMETHASONE SODIUM PHOSPHATE SCH MG: 4 INJECTION, SOLUTION INTRAMUSCULAR; INTRAVENOUS at 12:45

## 2020-12-30 RX ADMIN — LEVOTHYROXINE SODIUM SCH MCG: 100 TABLET ORAL at 05:13

## 2020-12-30 RX ADMIN — Medication SCH CAP: at 21:06

## 2020-12-30 RX ADMIN — SUCRALFATE SCH GM: 1 TABLET ORAL at 17:17

## 2020-12-30 RX ADMIN — VITAMIN D, TAB 1000IU (100/BT) SCH UNIT: 25 TAB at 10:24

## 2020-12-30 RX ADMIN — PRIMIDONE SCH MG: 50 TABLET ORAL at 10:21

## 2020-12-30 RX ADMIN — ENOXAPARIN SODIUM SCH MG: 100 INJECTION SUBCUTANEOUS at 17:17

## 2020-12-30 RX ADMIN — DEXAMETHASONE SODIUM PHOSPHATE SCH MG: 4 INJECTION, SOLUTION INTRAMUSCULAR; INTRAVENOUS at 21:07

## 2020-12-30 RX ADMIN — FAMOTIDINE SCH MG: 20 TABLET ORAL at 10:21

## 2020-12-30 RX ADMIN — POTASSIUM CHLORIDE SCH MEQ: 1500 TABLET, EXTENDED RELEASE ORAL at 10:23

## 2020-12-30 RX ADMIN — ISOSORBIDE MONONITRATE SCH MG: 30 TABLET, EXTENDED RELEASE ORAL at 10:21

## 2020-12-30 RX ADMIN — IPRATROPIUM BROMIDE AND ALBUTEROL SCH PUFF: 20; 100 SPRAY, METERED RESPIRATORY (INHALATION) at 21:06

## 2020-12-30 RX ADMIN — SENNOSIDES A AND B SCH MG: 8.6 TABLET, FILM COATED ORAL at 09:00

## 2020-12-30 RX ADMIN — ACETAMINOPHEN PRN MG: 500 TABLET ORAL at 21:06

## 2020-12-30 RX ADMIN — AZITHROMYCIN SCH MG: 250 TABLET, FILM COATED ORAL at 10:22

## 2020-12-30 RX ADMIN — IPRATROPIUM BROMIDE AND ALBUTEROL SCH PUFF: 20; 100 SPRAY, METERED RESPIRATORY (INHALATION) at 17:18

## 2020-12-30 RX ADMIN — Medication SCH CAP: at 10:21

## 2020-12-30 RX ADMIN — SUCRALFATE SCH GM: 1 TABLET ORAL at 21:06

## 2020-12-30 RX ADMIN — DIAZEPAM SCH MG: 5 TABLET ORAL at 21:06

## 2020-12-30 RX ADMIN — FLUTICASONE PROPIONATE SCH SPRAY: 50 SPRAY, METERED NASAL at 09:00

## 2020-12-30 RX ADMIN — PANTOPRAZOLE SODIUM SCH MG: 40 TABLET, DELAYED RELEASE ORAL at 10:24

## 2020-12-30 RX ADMIN — IPRATROPIUM BROMIDE AND ALBUTEROL SCH PUFF: 20; 100 SPRAY, METERED RESPIRATORY (INHALATION) at 12:45

## 2020-12-30 RX ADMIN — ASPIRIN SCH MG: 81 TABLET, COATED ORAL at 10:26

## 2020-12-30 RX ADMIN — IPRATROPIUM BROMIDE AND ALBUTEROL SCH PUFF: 20; 100 SPRAY, METERED RESPIRATORY (INHALATION) at 10:25

## 2020-12-30 RX ADMIN — DEXAMETHASONE SODIUM PHOSPHATE SCH MG: 4 INJECTION, SOLUTION INTRAMUSCULAR; INTRAVENOUS at 05:13

## 2020-12-30 RX ADMIN — SUCRALFATE SCH GM: 1 TABLET ORAL at 12:45

## 2020-12-30 RX ADMIN — SUCRALFATE SCH GM: 1 TABLET ORAL at 10:21

## 2020-12-30 RX ADMIN — Medication SCH MG: at 10:23

## 2020-12-30 NOTE — EKG
[facility] [address], [place] [address]

Test Date:    2020               Test Time:    15:03:12

Pat Name:     ADÁN NEAL           Department:   

Patient ID:   [id number]           Room:         124 A

Gender:       F                        Technician:   

:          1940               Requested By: CATHIE MCFARLAND

Order Number: [id number]            Reading MD:     

                                 Measurements

Intervals                              Axis          

Rate:         74                       P:            

MS:                                    QRS:          24

QRSD:         72                       T:            66

QT:           424                                    

QTc:          471                                    

                           Interpretive Statements

SINUS RHYTHM

ST & T ABNORMALITY, CONSIDER

HIGH LATERAL ISCHEMIA OR LEFT VENTRICULAR STRAIN

ABNORMAL ECG

RI6.01

No previous ECG available for comparison

## 2020-12-30 NOTE — PDOC2
CARDIAC CONSULT


DATE OF CONSULT


DOS:


DATE: 12/30/20 


TIME: 08:21





REASON FOR CONSULT


Reason for Consult


Chest pain





REFERRING PHYSICIAN


Referring Physician


Dr. Solis





SOURCE


Source:  Chart review, Patient





HPI


History of Present Illness


This is an 79 yo female who presented secondary to weakness, fatigue, nausea, 

and diarrhea. Is COVID + and had been treated. Overnight, had complaints of 

chest pain, which prompted this consult. Patient reports she had gas and 

belching. Did not receive her Tagamet that she normally takes for gas/belching. 

Pain in resolved this am. Has a history of CAD s/p CABG in 2011. Reports she 

follows with  cardiology, but it appears that she has not seem then since 2013

per review of their records. States that she is a DNR and does not want any 

further cardiac workup. 


Denies any associated palpitations, or nausea/vomiting.





PAST MEDICAL HISTORY


Cardiovascular:  CAD, CHF, HTN, hyperipidemia


Pulmonary:  Asthma, COPD, Other (KALIN)


GI:  Diverticulosis, GERD


Heme/Onc:  Other (DVT)


Psych:  Anxiety


Musculoskeletal:  Osteoarthritis


Endocrine:  Hypothyroidism





PAST SURGICAL HISTORY


Past Surgical History:  Appendectomy, Cholecystectomy, CABG, Hysterectomy, 

Colectomy





FAMILY HISTORY


Family History:  Other (CODP, lung CA)





SOCIAL HISTORY


Smoke:  1 pack per day


ALCOHOL:  none


Drugs:  None





CURRENT MEDICATIONS


Current Medications





Current Medications


Lactated Ringer's 1,000 ml @  100 mls/hr Q10H IV  Last administered on 

12/24/20at 19:13;  Start 12/24/20 at 18:00;  Stop 12/25/20 at 03:59;  Status DC


Ondansetron HCl (Zofran) 8 mg 1X  ONCE IVP  Last administered on 12/24/20at 

21:28;  Start 12/24/20 at 19:30;  Stop 12/24/20 at 19:34;  Status DC


Famotidine (Pepcid Vial) 20 mg 1X  ONCE IVP  Last administered on 12/24/20at 

21:28;  Start 12/24/20 at 19:30;  Stop 12/24/20 at 19:34;  Status DC


Ondansetron HCl (Zofran) 4 mg PRN Q4HRS  PRN IVP NAUSEA/VOMITING;  Start 

12/24/20 at 19:45;  Stop 12/25/20 at 19:44;  Status DC


Acetaminophen (Tylenol) 650 mg PRN Q4HRS  PRN PO FEVER > 100.3'F Last 

administered on 12/25/20at 05:59;  Start 12/24/20 at 19:45;  Stop 12/25/20 at 

19:44;  Status DC


Azithromycin (Zithromax) 500 mg 1X  ONCE PO  Last administered on 12/24/20at 

21:29;  Start 12/24/20 at 20:30;  Stop 12/24/20 at 20:31;  Status DC


Azithromycin (Zithromax) 250 mg DAILY  ONCE PO  Last administered on 12/25/20at 

08:48;  Start 12/25/20 at 09:00;  Stop 12/25/20 at 09:01;  Status DC


Albuterol/ Ipratropium (Combivent Respimat  Mcg) 1 puff RTQID INH  Last 

administered on 12/29/20at 21:11;  Start 12/25/20 at 00:00


Albuterol Sulfate (Ventolin Hfa Inhaler) 1 puff PRN Q6HRS  PRN INH SOA Last 

administered on 12/29/20at 17:47;  Start 12/25/20 at 08:15


Amlodipine Besylate (Norvasc) 2.5 mg HS PO  Last administered on 12/29/20at 

21:11;  Start 12/25/20 at 21:00


Aspirin (Aspirin Enteric Coated) 81 mg DAILY PO  Last administered on 12/25/20at

08:47;  Start 12/25/20 at 09:00;  Stop 12/25/20 at 10:51;  Status DC


Vitamin D (Vitamin D3) 1,000 unit DAILY PO  Last administered on 12/29/20at 

08:56;  Start 12/25/20 at 09:00


Clopidogrel Bisulfate (Plavix) 75 mg DAILY PO  Last administered on 12/25/20at 

08:48;  Start 12/25/20 at 09:00;  Stop 12/25/20 at 11:33;  Status DC


Diazepam (Valium) 5 mg HS PO  Last administered on 12/29/20at 21:11;  Start 

12/25/20 at 21:00


Diphenhydramine HCl (Benadryl) 25 mg HS PO  Last administered on 12/29/20at 

21:12;  Start 12/25/20 at 21:00


Fluticasone Propionate (Flonase) 2 spray DAILY NS  Last administered on 

12/29/20at 08:59;  Start 12/25/20 at 09:00


Ipratropium Bromide (Atrovent) 0.2 mg QID NEB ;  Start 12/25/20 at 09:00;  Stop 

12/25/20 at 08:20;  Status DC


Isosorbide Mononitrate (Imdur) 30 mg DAILY PO  Last administered on 12/29/20at 

08:55;  Start 12/25/20 at 09:00


Levothyroxine Sodium (Synthroid) 112 mcg DAILYAC PO  Last administered on 

12/25/20at 08:30;  Start 12/25/20 at 08:30;  Stop 12/26/20 at 09:41;  Status DC


Metoprolol Succinate (Toprol Xl) 50 mg DAILY PO  Last administered on 12/29/20at

08:55;  Start 12/25/20 at 09:00


Nitroglycerin (Nitrostat) 0.4 mg PRN Q15MIN  PRN SL CHEST PAIN Last administered

on 12/29/20at 19:15;  Start 12/25/20 at 08:00


Potassium Chloride (Klor-Con) 20 meq DAILYWBKFT PO  Last administered on 12/29 /20at 08:58;  Start 12/25/20 at 08:00


Sennosides (Senna) 8.6 mg DAILY PO  Last administered on 12/29/20at 08:58;  

Start 12/25/20 at 09:00


Famotidine (Pepcid) 20 mg DAILY PO  Last administered on 12/29/20at 08:55;  

Start 12/25/20 at 09:00


Non-Formulary Medication (Fluticasone/ Salmeterol (Advair 500-50 Diskus)) 1 puff

BID INH ;  Start 12/25/20 at 09:00;  Stop 12/25/20 at 08:25;  Status DC


Meclizine HCl (Antivert) 25 mg PRN TID  PRN PO DIZZINESS;  Start 12/25/20 at 

08:30


Ceftriaxone Sodium 2 gm/ Sodium Chloride 100 ml @  200 mls/hr 1X  ONCE IV ;  

Start 12/25/20 at 08:00;  Stop 12/25/20 at 08:09;  Status DC


Ceftriaxone Sodium 1 gm/ Sodium Chloride 50 ml @  100 mls/hr DAILY IV  Last 

administered on 12/29/20at 09:04;  Start 12/25/20 at 09:00


Azithromycin (Zithromax) 250 mg DAILY PO  Last administered on 12/29/20at 08:58;

 Start 12/26/20 at 09:00


Furosemide (Lasix) 60 mg DAILY PO  Last administered on 12/29/20at 08:55;  Start

12/25/20 at 09:00


Pantoprazole Sodium (Protonix) 40 mg 1X  ONCE PO  Last administered on 1

2/25/20at 10:45;  Start 12/25/20 at 10:45;  Stop 12/25/20 at 10:57;  Status DC


Pantoprazole Sodium (Protonix) 40 mg DAILYAC PO  Last administered on 12/29/20at

08:56;  Start 12/26/20 at 07:30


Famotidine (Pepcid) 20 mg QHS PO ;  Start 12/25/20 at 21:00;  Stop 12/25/20 at 

11:27;  Status DC


Sucralfate (Carafate) 1 gm QID PO  Last administered on 12/29/20at 19:02;  Start

12/25/20 at 13:00


Levothyroxine Sodium (Synthroid) 112 mcg DAILY06 PO ;  Start 12/27/20 at 06:00; 

Stop 12/26/20 at 17:21;  Status DC


Lactobacillus Rhamnosus (Culturelle) 1 cap BID PO  Last administered on 

12/29/20at 21:12;  Start 12/26/20 at 21:00


Levofloxacin/ Dextrose (Levaquin Per Pharmacy) 1 each PRN DAILY  PRN MC SEE 

COMMENTS;  Start 12/26/20 at 12:15


Levofloxacin/ Dextrose 100 ml @  100 mls/hr 1X  ONCE IV  Last administered on 

12/26/20at 12:30;  Start 12/26/20 at 12:30;  Stop 12/26/20 at 13:29;  Status DC


Levofloxacin/ Dextrose 50 ml @ 50 mls/hr Q24H IV  Last administered on 

12/29/20at 12:10;  Start 12/27/20 at 12:00


Acetaminophen (Tylenol) 500 mg PRN Q6HRS  PRN PO MILD PAIN / TEMP > 100.3'F Last

administered on 12/26/20at 21:06;  Start 12/26/20 at 14:15


Enoxaparin Sodium (Lovenox 40mg Syringe) 40 mg Q24H SQ ;  Start 12/26/20 at 

16:00;  Stop 12/26/20 at 16:00;  Status DC


Dexamethasone Sodium Phosphate (Decadron) 4 mg Q6HRS IVP  Last administered on 

12/28/20at 16:30;  Start 12/26/20 at 18:00;  Stop 12/28/20 at 17:59;  Status DC


Enoxaparin Sodium (Lovenox 30mg Syringe) 30 mg Q24H SQ  Last administered on 

12/29/20at 16:21;  Start 12/27/20 at 16:00


Levothyroxine Sodium (Synthroid) 100 mcg DAILY06 PO  Last administered on 

12/30/20at 05:13;  Start 12/27/20 at 06:00


Primidone (Mysoline) 50 mg DAILY PO  Last administered on 12/29/20at 08:54;  

Start 12/27/20 at 09:00


Dexamethasone Sodium Phosphate (Decadron) 4 mg Q8HRS IVP  Last administered on 

12/30/20at 05:13;  Start 12/28/20 at 22:00





Active Scripts


Active


Reported


Furosemide 40 Mg Tablet 1.5 Tab PO DAILY


Senna (Sennosides) 8.6 Mg Tablet 1 Tab PO DAILY


NITROGLYCERIN SubLingual (Nitroglycerin) 0.4 Mg Tab.subl 1 Tab SL PRN Q15MIN PRN


Vitamin D3 (Cholecalciferol (Vitamin D3)) 25 Mcg Tablet 1 Tab PO DAILY


Klor-Con M20 (Potassium Chloride) 20 Meq Tab.er.prt 1 Tab PO DAILYWBKFT


Clopidogrel (Clopidogrel Bisulfate) 75 Mg Tablet 1 Tab PO DAILY


Isosorbide Mononitrate Er (Isosorbide Mononitrate) 30 Mg Tab.er.24h 1 Tab PO 

DAILY


Fluticasone Propionate Nasal Spray (Fluticasone Propionate) 16 Gm Spray.susp 2 

Sprays NS DAILY


Allergy Relief (Diphenhydramine Hcl) 25 Mg Capsule 1 Cap PO HS


Meclizine Hcl 25 Mg Tablet 1 Tab PO TID PRN


Aspirin Ec (Aspirin) 81 Mg Tablet.dr 1 Tab PO DAILY


Cimetidine 400 Mg Tablet 1 Tab PO BID


Amlodipine Besylate 2.5 Mg Tablet 1 Tab PO HS


Advair 500-50 Diskus (Fluticasone/Salmeterol) 1 Each Disk.w.dev 1 Puff INH BID


Ipratropium Bromide 0.2 Mg/1 Ml Solution 1 Vial NEB QID


Metoprolol Succinate ( Xl ) (Metoprolol Succinate) 50 Mg Tab.er.24h 1 Tab PO 

DAILY


Synthroid (Levothyroxine Sodium) 112 Mcg Tablet 1 Tab PO DAILY


Albuterol Sulfate Neb Soln  ** (Albuterol Sulfate) 2.5 Mg/3 Ml Vial.neb 3 Ml NEB

PRN Q6HRS PRN


Diazepam 5 Mg Tablet 5 Mg PO HS





ALLERGIES


Allergies:  


Coded Allergies:  


     Iodine and Iodide Containing Produc (Verified  Allergy, Unknown, KIDNEY 

FAILURE, 12/24/20)


   PATIENT STATES CONTRAST INDUCED KIDNEY FAILURE





ROS


Review of Systems


14 point ROS conducted with pertinent positives noted above in HPI





PHYSICAL EXAM


General:  Alert, Oriented X3, Cooperative, No acute distress


HEENT:  Atraumatic, Mucous membr. moist/pink


Lungs:  Other (on NC)


Heart:  Regular rate


Abdomen:  Soft


Extremities:  No edema, Normal pulses


Skin:  No breakdown


Neuro:  Normal speech, Sensation intact


Psych/Mental Status:  Mental status NL, Mood NL


MUSCULOSKELETAL:  Osteoarthritic changes both hands





VITALS


Vital Signs





Vital Signs








  Date Time  Temp Pulse Resp B/P (MAP) Pulse Ox O2 Delivery O2 Flow Rate FiO2


 


12/30/20 06:01 96.9 71 20 137/78 (97) 92 Nasal Cannula 3.5 











LABS


LABS





Laboratory Tests








Test


 12/28/20


10:06 12/29/20


16:40 12/30/20


05:40


 


White Blood Count


 11.4 x10^3/uL


(4.0-11.0) 


 11.8 x10^3/uL


(4.0-11.0)


 


Red Blood Count


 3.02 x10^6/uL


(3.50-5.40) 


 3.44 x10^6/uL


(3.50-5.40)


 


Hemoglobin


 9.4 g/dL


(12.0-15.5) 9.1 g/dL


(12.0-15.5) 10.7 g/dL


(12.0-15.5)


 


Hematocrit


 28.6 %


(36.0-47.0) 27.7 %


(36.0-47.0) 34.5 %


(36.0-47.0)


 


Mean Corpuscular Volume


 95 fL () 


 


 100 fL


()


 


Mean Corpuscular Hemoglobin 31 pg (25-35)   31 pg (25-35) 


 


Mean Corpuscular Hemoglobin


Concent 33 g/dL


(31-37) 


 31 g/dL


(31-37)


 


Red Cell Distribution Width


 13.0 %


(11.5-14.5) 


 13.4 %


(11.5-14.5)


 


Platelet Count


 278 x10^3/uL


(140-400) 


 366 x10^3/uL


(140-400)


 


Neutrophils (%) (Auto) 84 % (31-73)   76 % (31-73) 


 


Lymphocytes (%) (Auto) 10 % (24-48)   15 % (24-48) 


 


Monocytes (%) (Auto) 6 % (0-9)   9 % (0-9) 


 


Eosinophils (%) (Auto) 0 % (0-3)   0 % (0-3) 


 


Basophils (%) (Auto) 0 % (0-3)   0 % (0-3) 


 


Neutrophils # (Auto)


 9.6 x10^3uL


(1.8-7.7) 


 9.0 x10^3uL


(1.8-7.7)


 


Lymphocytes # (Auto)


 1.2 x10^3/uL


(1.0-4.8) 


 1.7 x10^3/uL


(1.0-4.8)


 


Monocytes # (Auto)


 0.6 x10^3/uL


(0.0-1.1) 


 1.1 x10^3/uL


(0.0-1.1)


 


Eosinophils # (Auto)


 0.0 x10^3/uL


(0.0-0.7) 


 0.0 x10^3/uL


(0.0-0.7)


 


Basophils # (Auto)


 0.0 x10^3/uL


(0.0-0.2) 


 0.0 x10^3/uL


(0.0-0.2)











ASSESSMENT/PLAN


Assessment/Plan


1.  Weakness, fatigue 


2.  Acute on chronic respiratory with COPD and COVID PNA


3.  DALILA on CKD; improved 


4.  Chest pain, atypical. Reports as "gas"


5.  Hypertension; controlled 


6.  Hyperlipidemia


7.  CAD s/p CABG in 2011. Previously followed with [facility], but has not seen since 

2013


8.  Chronic diastolic CHF; appears compensated 


9.  Hypothyroidism 


10. Anxiety; son-in-law in currently inpatient Hospice next-door








Recommendations





Add ASA, statin


Continue BB, Imtravisr


Trend trop


EKG


Lipids


Secondary prevention 


Outpatient echo when recovered from COVID


Conservative management as per patient wishes











CATHIE MCFARLAND           Dec 30, 2020 08:31

## 2020-12-30 NOTE — PN
DATE:  



SUBJECTIVE:  An 80-year-old female, in with sepsis secondary to COVID-19

pneumonia, exacerbation of her emphysema with the COVID, acute respiratory

failure and anemia.  The patient otherwise seems to be resting fairly

comfortably, gaining strength.



OBJECTIVE:

VITAL SIGNS:  Blood pressure 129/54, respirations 17, pulse 70, afebrile.  The

patient is having 91% on room air.

LUNGS:  Otherwise, lungs are diminished, but basically clear.

CARDIOVASCULAR:  Stable.

ABDOMEN:  Soft, nontender.  



The patient is alert and oriented, more or less baseline.  There apparently has

had elevated troponins, but she has been worked up by Dr. Garcia in the past

and apparently nothing more can be done for her.  We will taper down on her

steroids and hopefully have her ready for discharge here very soon.



IMPRESSION:  Therefore of sepsis with pneumonia and COVID-19, chronic kidney

disease stage 3a, history of severe emphysema, acute respiratory failure.





______________________________

EVA DAVIS MD



DR:  ABDIEL/josiane  JOB#:  [id number] / [id number]

DD:  12/30/2020 17:25  DT:  12/30/2020 19:45

## 2020-12-31 VITALS — SYSTOLIC BLOOD PRESSURE: 124 MMHG | DIASTOLIC BLOOD PRESSURE: 53 MMHG

## 2020-12-31 VITALS — DIASTOLIC BLOOD PRESSURE: 46 MMHG | SYSTOLIC BLOOD PRESSURE: 118 MMHG

## 2020-12-31 LAB
HCT VFR BLD CALC: 27.2 % (ref 36–47)
HGB BLD-MCNC: 8.8 G/DL (ref 12–15.5)

## 2020-12-31 RX ADMIN — ASPIRIN SCH MG: 81 TABLET, COATED ORAL at 07:56

## 2020-12-31 RX ADMIN — IPRATROPIUM BROMIDE AND ALBUTEROL SCH PUFF: 20; 100 SPRAY, METERED RESPIRATORY (INHALATION) at 07:56

## 2020-12-31 RX ADMIN — FAMOTIDINE SCH MG: 20 TABLET ORAL at 07:56

## 2020-12-31 RX ADMIN — ISOSORBIDE MONONITRATE SCH MG: 30 TABLET, EXTENDED RELEASE ORAL at 07:56

## 2020-12-31 RX ADMIN — PANTOPRAZOLE SODIUM SCH MG: 40 TABLET, DELAYED RELEASE ORAL at 07:54

## 2020-12-31 RX ADMIN — VITAMIN D, TAB 1000IU (100/BT) SCH UNIT: 25 TAB at 07:56

## 2020-12-31 RX ADMIN — LEVOTHYROXINE SODIUM SCH MCG: 100 TABLET ORAL at 06:00

## 2020-12-31 RX ADMIN — SENNOSIDES A AND B SCH MG: 8.6 TABLET, FILM COATED ORAL at 07:54

## 2020-12-31 RX ADMIN — Medication SCH CAP: at 07:55

## 2020-12-31 RX ADMIN — PRIMIDONE SCH MG: 50 TABLET ORAL at 07:53

## 2020-12-31 RX ADMIN — FLUTICASONE PROPIONATE SCH SPRAY: 50 SPRAY, METERED NASAL at 07:57

## 2020-12-31 RX ADMIN — POTASSIUM CHLORIDE SCH MEQ: 1500 TABLET, EXTENDED RELEASE ORAL at 07:55

## 2020-12-31 RX ADMIN — SUCRALFATE SCH GM: 1 TABLET ORAL at 07:55

## 2020-12-31 RX ADMIN — Medication SCH MG: at 07:55

## 2020-12-31 RX ADMIN — AZITHROMYCIN SCH MG: 250 TABLET, FILM COATED ORAL at 07:53

## 2020-12-31 RX ADMIN — METOPROLOL SUCCINATE SCH MG: 50 TABLET, EXTENDED RELEASE ORAL at 07:53

## 2020-12-31 RX ADMIN — DEXAMETHASONE SODIUM PHOSPHATE SCH MG: 4 INJECTION, SOLUTION INTRAMUSCULAR; INTRAVENOUS at 08:42

## 2020-12-31 NOTE — PDOC
CARDIO Progress Notes


Date & Time


Date of Service


DATE: 12/31/20 


TIME: 09:50


Time of Evaluation


09:50





Subjective


Notes


No chest pain, palpitations, dizziness, diaphoresis. SOA persists, but better





Vitals


Vitals





Vital Signs








  Date Time  Temp Pulse Resp B/P (MAP) Pulse Ox O2 Delivery O2 Flow Rate FiO2


 


12/31/20 08:40      Nasal Cannula 2.0 


 


12/31/20 07:56  58  118/46    


 


12/31/20 05:41 95.2  20  94   








Weight


Weight [ ]





Input and Output


I.O.











Intake and Output 


 


 12/31/20





 07:00


 


Intake Total 810 ml


 


Balance 810 ml


 


 


 


Intake Oral 710 ml


 


IV Total 100 ml


 


# Voids 3











Laboratory


Labs





Laboratory Tests








Test


 12/29/20


16:40 12/30/20


05:40 12/30/20


07:05 12/30/20


09:25


 


Hemoglobin


 9.1 g/dL


(12.0-15.5) 10.7 g/dL


(12.0-15.5) 


 





 


Hematocrit


 27.7 %


(36.0-47.0) 34.5 %


(36.0-47.0) 


 





 


White Blood Count


 


 11.8 x10^3/uL


(4.0-11.0) 


 





 


Red Blood Count


 


 3.44 x10^6/uL


(3.50-5.40) 


 





 


Mean Corpuscular Volume


 


 100 fL


() 


 





 


Mean Corpuscular Hemoglobin  31 pg (25-35)   


 


Mean Corpuscular Hemoglobin


Concent 


 31 g/dL


(31-37) 


 





 


Red Cell Distribution Width


 


 13.4 %


(11.5-14.5) 


 





 


Platelet Count


 


 366 x10^3/uL


(140-400) 


 





 


Neutrophils (%) (Auto)  76 % (31-73)   


 


Lymphocytes (%) (Auto)  15 % (24-48)   


 


Monocytes (%) (Auto)  9 % (0-9)   


 


Eosinophils (%) (Auto)  0 % (0-3)   


 


Basophils (%) (Auto)  0 % (0-3)   


 


Neutrophils # (Auto)


 


 9.0 x10^3uL


(1.8-7.7) 


 





 


Lymphocytes # (Auto)


 


 1.7 x10^3/uL


(1.0-4.8) 


 





 


Monocytes # (Auto)


 


 1.1 x10^3/uL


(0.0-1.1) 


 





 


Eosinophils # (Auto)


 


 0.0 x10^3/uL


(0.0-0.7) 


 





 


Basophils # (Auto)


 


 0.0 x10^3/uL


(0.0-0.2) 


 





 


Iron Level


 


 


 44 ug/dL


() 





 


Total Iron Binding Capacity


 


 


 168 ug/dL


(250-450) 





 


Iron Saturation   26 % (15-34)  


 


Triglycerides Level


 


 


 183 mg/dL


(0-150) 





 


Cholesterol Level


 


 


 212 mg/dL


(0-200) 





 


LDL Cholesterol, Calculated


 


 


 142 mg/dL


(0-100) 





 


VLDL Cholesterol, Calculated


 


 


 36 mg/dL


(0-40) 





 


Non-HDL Cholesterol Calculated


 


 


 178 mg/dL


(0-129) 





 


HDL Cholesterol


 


 


 34 mg/dL


(40-60) 





 


Cholesterol/HDL Ratio   6.0  


 


Troponin I Quantitative


 


 


 


 0.454 ng/mL


(0-0.055)


 


Test


 12/30/20


13:10 12/31/20


06:00 


 





 


Troponin I Quantitative


 0.331 ng/mL


(0-0.055) 


 


 





 


Hemoglobin


 


 8.8 g/dL


(12.0-15.5) 


 





 


Hematocrit


 


 27.2 %


(36.0-47.0) 


 














Microbiology


Micro





Microbiology


12/25/20 Blood Culture - Final, Complete


           NO GROWTH AFTER 5 DAYS...





Physical Exams


HEENT:  Neck Supple W Full Motion


Chest:  Symmetric


Lungs:  Other (on NC)


Heart:  RRR


Abdomen:  Soft N/T


Extremities:  No Edema


Neurology:  alert, follow commands, other (forgetful)





Assessment


Assessment


1.  Weakness, fatigue 


2.  Acute on chronic respiratory with COPD and COVID PNA


3.  DALILA on CKD; improved 


4.  Chest pain, atypical. Reports as "gas". Trop peak 0.4. Possibly type II, 

demand ischemia. Echo 2018 with preserved LV systolic function. Patient not 

interested in further ischemia evaluation


5.  Hypertension; controlled 


6.  Hyperlipidemia; 


7.  CAD s/p CABG in 2011. Cath 7/18 with 1/3 grafts patent 


8.  Chronic diastolic CHF; appears compensated 


9.  Hypothyroidism; on replacement 


10.  Tobaccoism








Recommendations





Continue secondary prevention including ASA, statin, BB, imdur


Outpatient echo when recovered from COVID


Conservative management as per patient wishes


Follow up in our office with Dr. Garcia February 10th at 2:30pm in 

[facility]. [facility], [address].











CATHIE MCFARLAND           Dec 31, 2020 09:59

## 2020-12-31 NOTE — DISCH
HOME HEALTH DISCHARGE/MEDS


DISCHARGE INFORMATION:


Discharge Date:  Dec 31, 2020


Final Diagnosis:


Problems


Medical Problems:


(1) Viral syndrome


Status: Acute  





(2) Weakness


Status: Acute  








Condition on Discharge:  Stable





CODE STATUS:


Code Status:  Full





HOME HEALTH:


Face to Face:


I certify this patient is under my care and that I, or a nurse practitioner or 

physician's assistant working with me, had a face to face encounter that meets 

the physician face to face encounter requirements with this patient on 

12/31/2020.


Medical Condition(s):  CHF, COPD, HTN, Pneumonia, Other (COVID positive)


Skilled Nursing For:  Assess Cardiopulm Status, Assess & Educate Safety, 

Assess/Skilled Observatio, Medication Management, Pain Management


Physical Therapy For:  Evalulation/Treatment


Occupational Therapy For:  Evaluation/Treatment


Homebound Status Met By:  Poor coordination w/ amb., Unsteady balance w/ amb,, 

Extreme weakness w/ amb.





POST DISCHARGE ORDERS:


Activity Instructions for Disc:  Activity as tolerated


Weight Bearing Status after Di:  No restrictions


DIET AFTER DISCHARGE:  Cardiac





CERTIFICATION STATEMENT:


Certification Statement: Based on the above finding, I certify that this patient

is confined to the home and needs intermittent skilled nursing care, physical 

therapy and/or speech therapy, or continues to need occupational therapy.~ This 

patient is under my care, and I have initiated the establishment of the plan of 

care.~ This patient will be followed by myself or a community physician who will

periodically review the plan of care.





DISCHARGE MEDICATIONS:


Home Meds


Active Scripts


Dexamethasone (Decadron) 4 Mg Tablet, 4 MG PO BID for inflammation, #16 TAB


   4mgBID for 4 days then decrease by 1 tablet for 4 days then


   1 tablet every other day for 4 days


   Prov:EVA DAVIS MD         12/31/20


Lactobacillus Rhamnosus Gg (CULTURELLE) 1 Each Cap.sprink, 1 CAP PO BID for 

probiotic for 30 Days, #60 CAP 3 Refills


   Prov:EVA DAVIS MD         12/31/20


Pantoprazole Sodium (PANTOPRAZOLE SODIUM) 40 Mg Tablet.dr, 40 MG PO DAILYAC for 

GERD, #30 TAB 3 Refills


   Prov:EVA DAVIS MD         12/31/20


Atorvastatin Calcium (ATORVASTATIN CALCIUM) 20 Mg Tablet, 40 MG PO QHS for 

cholesterol for 30 Days, #60 TAB 3 Refills


   Prov:EVA DAVIS MD         12/31/20


Primidone (MYSOLINE) 50 Mg Tablet, 50 MG PO DAILY for /;, for 30 Days, #30 TAB 1

Refill


   Prov:EVA DAVIS MD         12/31/20


Sucralfate (CARAFATE) 1 Gm Tablet, 1 GM PO QID for stomach for 30 Days, #120 TAB

1 Refill


   Prov:EVA DAVIS MD         12/31/20


Azithromycin (AZITHROMYCIN TABLET) 250 Mg Tablet, 250 MG PO DAILY for infection,

#4 TAB


   Prov:EVA DAVIS MD         12/31/20


Aspirin (ASPIRIN EC) 81 Mg Tablet.dr, 1 TAB PO QODAY for HEART HEALTH for 90 

Days, #90 TAB 3 Refills


   Prov:EVA DAVIS MD         12/31/20


Levothyroxine Sodium (LEVOTHYROXINE SODIUM) 100 Mcg Tablet, 1 TAB PO DAILY for 

hypothyrodism, #30 TAB 5 Refills


   Prov:EVA DAVIS MD         12/31/20


Reported Medications


Furosemide (FUROSEMIDE) 40 Mg Tablet, 1.5 TAB PO DAILY for SWELLING


   12/24/20


Sennosides (SENNA) 8.6 Mg Tablet, 1 TAB PO DAILY for CONSTIPATION


   12/24/20


Nitroglycerin (NITROGLYCERIN SubLingual) 0.4 Mg Tab.subl, 1 TAB SL PRN Q15MIN 

PRN for CHEST PAIN


   12/24/20


Cholecalciferol (Vitamin D3) (Vitamin D3) 25 Mcg Tablet, 1 TAB PO DAILY for 

SUPPLEMENT


   12/24/20


Potassium Chloride (KLOR-CON M20) 20 Meq Tab.er.prt, 1 TAB PO DAILYWBKFT for 

SUPPLEMENT


   12/24/20


Isosorbide Mononitrate (ISOSORBIDE MONONITRATE ER) 30 Mg Tab.er.24h, 1 TAB PO 

DAILY for BLOOD PRESSURE


   12/24/20


Fluticasone Propionate (FLUTICASONE PROPIONATE NASAL SPRAY) 16 Gm [place].susp, 2 

SPRAYS NS DAILY for ALLERGIES


   12/24/20


Diphenhydramine Hcl (ALLERGY RELIEF) 25 Mg Capsule, 1 CAP PO HS for SLEEP


   12/24/20


Meclizine Hcl (MECLIZINE HCL) 25 Mg Tablet, 1 TAB PO TID PRN for VERTIGO


   12/24/20


Cimetidine (CIMETIDINE) 400 Mg Tablet, 1 TAB PO BID for heartburn


   12/24/20


Amlodipine Besylate (AMLODIPINE BESYLATE) 2.5 Mg Tablet, 1 TAB PO HS for blood 

pressure


   12/24/20


Fluticasone/Salmeterol (ADVAIR 500-50 DISKUS) 1 Each Disk.w.dev, 1 PUFF INH BID 

for copd


   12/24/20


Ipratropium Bromide (IPRATROPIUM BROMIDE) 0.2 Mg/1 Ml Solution, 1 VIAL NEB QID 

for COPD


   12/24/20


Metoprolol Succinate (METOPROLOL SUCCINATE ( XL )) 50 Mg Tab.er.24h, 1 TAB PO 

DAILY for HEART RATE CONTROL


   12/24/20


Albuterol Sulfate (ALBUTEROL SULFATE NEB SOLN  **) 2.5 Mg/3 Ml Vial.neb, 3 ML 

NEB PRN Q6HRS PRN for WHEEZING


   12/24/20


Diazepam (DIAZEPAM) 5 Mg Tablet, 5 MG PO HS for ANXIETY


   12/24/20


Discontinued Reported Medications


Clopidogrel Bisulfate (CLOPIDOGREL) 75 Mg Tablet, 1 TAB PO DAILY for CAD


   12/24/20


Levothyroxine Sodium (SYNTHROID) 112 Mcg Tablet, 1 TAB PO DAILY for HYPOTHYROID


   12/24/20











EVA DAVIS MD           Dec 31, 2020 12:17

## 2020-12-31 NOTE — NUR
DISCHAGRGE NOTE-PATIENT IS DRESSED ET AWAITING DELIVERY OF COAT FROM FAMILY FOR DC. PIV 
DC'D, NO COMPLICATIONS. VSS, PT CONTINUES TO USE O2 PER NC AS IS HER USUAL. ALL BELONGINGS 
PACKED ET READY TO SEND WITH HER ONCE FAMILY ARRIVES. PT WILL UTILIZE W/C FOR MOBILITY TO 
FRONT DOORS.